# Patient Record
Sex: MALE | Race: WHITE | Employment: OTHER | URBAN - METROPOLITAN AREA
[De-identification: names, ages, dates, MRNs, and addresses within clinical notes are randomized per-mention and may not be internally consistent; named-entity substitution may affect disease eponyms.]

---

## 2017-04-26 ENCOUNTER — OFFICE VISIT (OUTPATIENT)
Dept: ENT CLINIC | Age: 67
End: 2017-04-26

## 2017-04-26 VITALS
WEIGHT: 185 LBS | HEART RATE: 62 BPM | SYSTOLIC BLOOD PRESSURE: 142 MMHG | BODY MASS INDEX: 26.48 KG/M2 | DIASTOLIC BLOOD PRESSURE: 92 MMHG | HEIGHT: 70 IN

## 2017-04-26 DIAGNOSIS — J31.0 RHINITIS, NONALLERGIC: Primary | ICD-10-CM

## 2017-04-26 PROCEDURE — 4040F PNEUMOC VAC/ADMIN/RCVD: CPT | Performed by: OTOLARYNGOLOGY

## 2017-04-26 PROCEDURE — 1123F ACP DISCUSS/DSCN MKR DOCD: CPT | Performed by: OTOLARYNGOLOGY

## 2017-04-26 PROCEDURE — G8420 CALC BMI NORM PARAMETERS: HCPCS | Performed by: OTOLARYNGOLOGY

## 2017-04-26 PROCEDURE — G8427 DOCREV CUR MEDS BY ELIG CLIN: HCPCS | Performed by: OTOLARYNGOLOGY

## 2017-04-26 PROCEDURE — 99213 OFFICE O/P EST LOW 20 MIN: CPT | Performed by: OTOLARYNGOLOGY

## 2017-04-26 PROCEDURE — 3017F COLORECTAL CA SCREEN DOC REV: CPT | Performed by: OTOLARYNGOLOGY

## 2017-04-26 PROCEDURE — 1036F TOBACCO NON-USER: CPT | Performed by: OTOLARYNGOLOGY

## 2017-04-26 RX ORDER — MONTELUKAST SODIUM 10 MG/1
10 TABLET ORAL NIGHTLY
Qty: 30 TABLET | Refills: 2 | Status: SHIPPED | OUTPATIENT
Start: 2017-04-26 | End: 2017-11-16 | Stop reason: SDUPTHER

## 2017-04-26 RX ORDER — LORAZEPAM 0.5 MG/1
.5-1 TABLET ORAL
COMMUNITY
Start: 2016-09-28

## 2017-04-26 RX ORDER — TRIAMCINOLONE ACETONIDE 55 UG/1
2 SPRAY, METERED NASAL DAILY
Qty: 1 INHALER | Refills: 3 | Status: SHIPPED | OUTPATIENT
Start: 2017-04-26 | End: 2022-10-17

## 2017-04-26 RX ORDER — SIMVASTATIN 10 MG
10 TABLET ORAL
COMMUNITY
Start: 2016-04-19

## 2017-06-09 RX ORDER — MONTELUKAST SODIUM 10 MG/1
TABLET ORAL
Qty: 90 TABLET | Refills: 3 | Status: SHIPPED | OUTPATIENT
Start: 2017-06-09 | End: 2017-11-14 | Stop reason: SDUPTHER

## 2017-11-14 ENCOUNTER — TELEPHONE (OUTPATIENT)
Dept: ENT CLINIC | Age: 67
End: 2017-11-14

## 2017-11-14 RX ORDER — MONTELUKAST SODIUM 10 MG/1
TABLET ORAL
Qty: 90 TABLET | Refills: 3 | Status: SHIPPED | OUTPATIENT
Start: 2017-11-14 | End: 2017-11-16 | Stop reason: SDUPTHER

## 2017-11-14 NOTE — TELEPHONE ENCOUNTER
[facility] R Trina Chandra 68 EN

## 2017-11-21 RX ORDER — MONTELUKAST SODIUM 10 MG/1
TABLET ORAL
Qty: 90 TABLET | Refills: 3 | Status: SHIPPED | OUTPATIENT
Start: 2017-11-21

## 2017-11-28 ENCOUNTER — TELEPHONE (OUTPATIENT)
Dept: DERMATOLOGY | Age: 67
End: 2017-11-28

## 2018-04-10 ENCOUNTER — OFFICE VISIT (OUTPATIENT)
Dept: DERMATOLOGY | Age: 68
End: 2018-04-10

## 2018-04-10 DIAGNOSIS — Z85.828 HISTORY OF SCC (SQUAMOUS CELL CARCINOMA) OF SKIN: ICD-10-CM

## 2018-04-10 DIAGNOSIS — L82.1 SK (SEBORRHEIC KERATOSIS): Primary | ICD-10-CM

## 2018-04-10 DIAGNOSIS — L57.0 AK (ACTINIC KERATOSIS): ICD-10-CM

## 2018-04-10 PROCEDURE — G8421 BMI NOT CALCULATED: HCPCS | Performed by: DERMATOLOGY

## 2018-04-10 PROCEDURE — 1036F TOBACCO NON-USER: CPT | Performed by: DERMATOLOGY

## 2018-04-10 PROCEDURE — 1123F ACP DISCUSS/DSCN MKR DOCD: CPT | Performed by: DERMATOLOGY

## 2018-04-10 PROCEDURE — 99202 OFFICE O/P NEW SF 15 MIN: CPT | Performed by: DERMATOLOGY

## 2018-04-10 PROCEDURE — 17000 DESTRUCT PREMALG LESION: CPT | Performed by: DERMATOLOGY

## 2018-04-10 PROCEDURE — G8427 DOCREV CUR MEDS BY ELIG CLIN: HCPCS | Performed by: DERMATOLOGY

## 2018-04-10 PROCEDURE — 3017F COLORECTAL CA SCREEN DOC REV: CPT | Performed by: DERMATOLOGY

## 2018-04-10 PROCEDURE — 17003 DESTRUCT PREMALG LES 2-14: CPT | Performed by: DERMATOLOGY

## 2018-04-10 PROCEDURE — 4040F PNEUMOC VAC/ADMIN/RCVD: CPT | Performed by: DERMATOLOGY

## 2018-04-10 RX ORDER — UBIDECARENONE 10 MG
CAPSULE ORAL
COMMUNITY

## 2018-04-10 RX ORDER — SILDENAFIL CITRATE 20 MG/1
40-100 TABLET ORAL
COMMUNITY
Start: 2017-10-05 | End: 2022-10-17

## 2018-04-10 RX ORDER — MULTIVIT-MIN/IRON/FOLIC ACID/K 18-600-40
CAPSULE ORAL
COMMUNITY

## 2018-04-10 RX ORDER — M-VIT,TX,IRON,MINS/CALC/FOLIC 27MG-0.4MG
1 TABLET ORAL DAILY
COMMUNITY

## 2018-10-24 ENCOUNTER — OFFICE VISIT (OUTPATIENT)
Dept: DERMATOLOGY | Age: 68
End: 2018-10-24
Payer: MEDICARE

## 2018-10-24 DIAGNOSIS — L82.1 SK (SEBORRHEIC KERATOSIS): ICD-10-CM

## 2018-10-24 DIAGNOSIS — L24.9 IRRITANT DERMATITIS: ICD-10-CM

## 2018-10-24 DIAGNOSIS — L73.8 SEBACEOUS GLAND HYPERPLASIA OF FACE: ICD-10-CM

## 2018-10-24 DIAGNOSIS — L57.0 AK (ACTINIC KERATOSIS): Primary | ICD-10-CM

## 2018-10-24 PROCEDURE — 99213 OFFICE O/P EST LOW 20 MIN: CPT | Performed by: DERMATOLOGY

## 2018-10-24 RX ORDER — TRIAMCINOLONE ACETONIDE 1 MG/G
CREAM TOPICAL
Qty: 45 G | Refills: 2 | Status: SHIPPED | OUTPATIENT
Start: 2018-10-24

## 2018-10-24 RX ORDER — FLUOROURACIL 50 MG/G
CREAM TOPICAL
Qty: 40 G | Refills: 0 | Status: SHIPPED | OUTPATIENT
Start: 2018-10-24

## 2019-04-24 ENCOUNTER — OFFICE VISIT (OUTPATIENT)
Dept: DERMATOLOGY | Age: 69
End: 2019-04-24
Payer: MEDICARE

## 2019-04-24 DIAGNOSIS — L82.1 SEBORRHEIC KERATOSIS: ICD-10-CM

## 2019-04-24 DIAGNOSIS — L57.0 ACTINIC KERATOSIS: Primary | ICD-10-CM

## 2019-04-24 PROCEDURE — 1123F ACP DISCUSS/DSCN MKR DOCD: CPT | Performed by: DERMATOLOGY

## 2019-04-24 PROCEDURE — 99213 OFFICE O/P EST LOW 20 MIN: CPT | Performed by: DERMATOLOGY

## 2019-04-24 PROCEDURE — 4040F PNEUMOC VAC/ADMIN/RCVD: CPT | Performed by: DERMATOLOGY

## 2019-04-24 PROCEDURE — G8427 DOCREV CUR MEDS BY ELIG CLIN: HCPCS | Performed by: DERMATOLOGY

## 2019-04-24 PROCEDURE — G8421 BMI NOT CALCULATED: HCPCS | Performed by: DERMATOLOGY

## 2019-04-24 PROCEDURE — 3017F COLORECTAL CA SCREEN DOC REV: CPT | Performed by: DERMATOLOGY

## 2019-04-24 PROCEDURE — 1036F TOBACCO NON-USER: CPT | Performed by: DERMATOLOGY

## 2019-04-24 NOTE — PROGRESS NOTES
[facility]  Lyly Shook  1950    77 y.o. male     Date of Visit: 4/24/2019    Chief Complaint: AKs    History of Present Illness:    1. F/u for multiple AKs on the forehead, cheeks, and hands - used Efudex cream daily for nearly 2 weeks with improvement. Still has some lesions on the hands. 2.  He complains of few lesions on the left thigh and shin. Outdoors a lot - like to fish and play golf.    Spends some time in [place].       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 Review of Systems:  Gen: Feels well, good sense of health. Skin: No new or changing moles, no history of keloids or hypertrophic scars. Heme: No abnormal bruising or bleeding. Past Medical History, Family History, Surgical History, Medications and Allergies reviewed. History reviewed. No pertinent past medical history. History reviewed. No pertinent surgical history. Allergies   Allergen Reactions    Doxycycline Other (See Comments)     Oral thrush     Outpatient Medications Marked as Taking for the 4/24/19 encounter (Office Visit) with Lynn Martinez MD   Medication Sig Dispense Refill    fluorouracil (EFUDEX) 5 % cream Apply to the sides of the forehead, lateral portions of the cheeks and right cheek twice daily for 14 days. 40 g 0    triamcinolone (KENALOG) 0.1 % cream Apply to affected areas twice daily for up to 2 weeks or until improved. For itching and dermatitis.  45 g 2    Coenzyme Q10 10 MG CAPS Take by mouth      Cholecalciferol (VITAMIN D) 2000 units CAPS capsule Take by mouth      Multiple Vitamins-Minerals (THERAPEUTIC MULTIVITAMIN-MINERALS) tablet Take 1 tablet

## 2020-01-13 ENCOUNTER — OFFICE VISIT (OUTPATIENT)
Dept: DERMATOLOGY | Age: 70
End: 2020-01-13
Payer: MEDICARE

## 2020-01-13 PROCEDURE — G8484 FLU IMMUNIZE NO ADMIN: HCPCS | Performed by: DERMATOLOGY

## 2020-01-13 PROCEDURE — G8427 DOCREV CUR MEDS BY ELIG CLIN: HCPCS | Performed by: DERMATOLOGY

## 2020-01-13 PROCEDURE — 4040F PNEUMOC VAC/ADMIN/RCVD: CPT | Performed by: DERMATOLOGY

## 2020-01-13 PROCEDURE — 17000 DESTRUCT PREMALG LESION: CPT | Performed by: DERMATOLOGY

## 2020-01-13 PROCEDURE — 99213 OFFICE O/P EST LOW 20 MIN: CPT | Performed by: DERMATOLOGY

## 2020-01-13 PROCEDURE — G8421 BMI NOT CALCULATED: HCPCS | Performed by: DERMATOLOGY

## 2020-01-13 PROCEDURE — 17003 DESTRUCT PREMALG LES 2-14: CPT | Performed by: DERMATOLOGY

## 2020-01-13 PROCEDURE — 3017F COLORECTAL CA SCREEN DOC REV: CPT | Performed by: DERMATOLOGY

## 2020-01-13 PROCEDURE — 1123F ACP DISCUSS/DSCN MKR DOCD: CPT | Performed by: DERMATOLOGY

## 2020-01-13 PROCEDURE — 1036F TOBACCO NON-USER: CPT | Performed by: DERMATOLOGY

## 2020-01-13 NOTE — PROGRESS NOTES
[facility]  Lyly Gomez  1950    68 y.o. male     Date of Visit: 1/13/2020    Chief Complaint: AKs    History of Present Illness:    1. Follow up for multiple actinic keratoses on the face and hands-treated with Efudex cream in 2019×2 with improvement. He complains of several lesions today on the right hand. He also has lesions on the face. 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 Outdoors a lot - like to fish and play golf.    Spends some time in [place].        Review of Systems:  Skin: No new or changing moles. Past Medical History, Family History, Surgical History, Medications and Allergies reviewed. History reviewed. No pertinent past medical history. History reviewed. No pertinent surgical history. Allergies   Allergen Reactions    Doxycycline Other (See Comments)     Oral thrush     Outpatient Medications Marked as Taking for the 1/13/20 encounter (Office Visit) with Danna Singh MD   Medication Sig Dispense Refill    fluorouracil (EFUDEX) 5 % cream Apply to the sides of the forehead, lateral portions of the cheeks and right cheek twice daily for 14 days. 40 g 0    triamcinolone (KENALOG) 0.1 % cream Apply to affected areas twice daily for up to 2 weeks or until improved. For itching and dermatitis.  45 g 2    Coenzyme Q10 10 MG CAPS Take by mouth      Cholecalciferol (VITAMIN D) 2000 units CAPS capsule Take by mouth      Multiple Vitamins-Minerals (THERAPEUTIC MULTIVITAMIN-MINERALS) tablet Take 1 tablet by mouth daily      montelukast (SINGULAIR) 10 MG tablet TAKE 1 TABLET DAILY 90 tablet 3    simvastatin (ZOCOR) 10 MG tablet Take 10 mg by mouth      LORazepam (ATIVAN) 0.5 MG tablet Take 0.5-1 mg by mouth      triamcinolone (NASACORT ALLERGY 24HR) 55 MCG/ACT nasal inhaler 2 sprays by Nasal route daily 1 Inhaler 3       Physical Examination       The following were examined and determined to be normal: Psych/Neuro, Scalp/hair, Conjunctivae/eyelids, Gums/teeth/lips, Neck, Breast/axilla/chest, Abdomen and Back. The following were examined and determined to be abnormal: Head/face, RUE and LUE. Well-appearing. 1.  Dorsum of the right hand-3, right wrist-1: Few hyperkeratotic erythematous macules/patches. Lateral aspects of the forehead and nasal dorsum with ill-defined scaly erythematous macules. Assessment and Plan     1. Actinic keratoses - few on the right hand, multiple on the forehead and nose    2 cycles of liquid nitrogen applied to 4 AKs: Dorsum of the right hand-3, right wrist-1. Patient was educated regarding the potential risks of blister formation, discomfort, hypopigmentation, and scar. Wound care was discussed. Efudex cream twice daily to lateral aspects of the forehead and nose for 14 days. We discussed the likely side effects of treatment including redness, crusting, itching and burning. Return in about 4 months (around 5/13/2020).

## 2020-08-20 ENCOUNTER — OFFICE VISIT (OUTPATIENT)
Dept: DERMATOLOGY | Age: 70
End: 2020-08-20
Payer: MEDICARE

## 2020-08-20 VITALS — TEMPERATURE: 97.5 F

## 2020-08-20 PROCEDURE — 1123F ACP DISCUSS/DSCN MKR DOCD: CPT | Performed by: DERMATOLOGY

## 2020-08-20 PROCEDURE — 1036F TOBACCO NON-USER: CPT | Performed by: DERMATOLOGY

## 2020-08-20 PROCEDURE — 4040F PNEUMOC VAC/ADMIN/RCVD: CPT | Performed by: DERMATOLOGY

## 2020-08-20 PROCEDURE — G8421 BMI NOT CALCULATED: HCPCS | Performed by: DERMATOLOGY

## 2020-08-20 PROCEDURE — 3017F COLORECTAL CA SCREEN DOC REV: CPT | Performed by: DERMATOLOGY

## 2020-08-20 PROCEDURE — G8427 DOCREV CUR MEDS BY ELIG CLIN: HCPCS | Performed by: DERMATOLOGY

## 2020-08-20 PROCEDURE — 99213 OFFICE O/P EST LOW 20 MIN: CPT | Performed by: DERMATOLOGY

## 2020-08-20 NOTE — PROGRESS NOTES
determined to be normal: Psych/Neuro, Scalp/hair, Conjunctivae/eyelids, Gums/teeth/lips, Neck, Breast/axilla/chest, Abdomen, Back, RLE, LLE and Nails/digits. The following were examined and determined to be abnormal: Head/face, RUE and LUE. Well appearing. 1.  Left cheek - small stuck on appearing verrucous white papule. 2.  Left upper forehead - multiple ill defined irreg shaped keratotic pink macules. Antihelix bilaterally, lateral cheeks, hands - few skin colored scaly pink macules. Assessment and Plan     1. SK (seborrheic keratosis)     Reassurance. 2. AK (actinic keratosis) -     Efudex cream twice daily to left side of the forehead for 14 days. We discussed the likely side effects of treatment including redness, crusting, itching and burning. Observe the smaller asymptomatic lesions on the remainder of the face and hands. Return in about 4 months (around 12/20/2020).

## 2020-12-09 ENCOUNTER — OFFICE VISIT (OUTPATIENT)
Dept: DERMATOLOGY | Age: 70
End: 2020-12-09
Payer: MEDICARE

## 2020-12-09 VITALS — TEMPERATURE: 97 F

## 2020-12-09 PROCEDURE — 99212 OFFICE O/P EST SF 10 MIN: CPT | Performed by: DERMATOLOGY

## 2020-12-09 PROCEDURE — G8484 FLU IMMUNIZE NO ADMIN: HCPCS | Performed by: DERMATOLOGY

## 2020-12-09 PROCEDURE — 4040F PNEUMOC VAC/ADMIN/RCVD: CPT | Performed by: DERMATOLOGY

## 2020-12-09 PROCEDURE — G8427 DOCREV CUR MEDS BY ELIG CLIN: HCPCS | Performed by: DERMATOLOGY

## 2020-12-09 PROCEDURE — G8421 BMI NOT CALCULATED: HCPCS | Performed by: DERMATOLOGY

## 2020-12-09 PROCEDURE — 3017F COLORECTAL CA SCREEN DOC REV: CPT | Performed by: DERMATOLOGY

## 2020-12-09 PROCEDURE — 1123F ACP DISCUSS/DSCN MKR DOCD: CPT | Performed by: DERMATOLOGY

## 2020-12-09 PROCEDURE — 1036F TOBACCO NON-USER: CPT | Performed by: DERMATOLOGY

## 2020-12-09 NOTE — PROGRESS NOTES
[facility]  [facility] Lyly Velasquez  1950    65 y.o. male     Date of Visit: 12/9/2020    Chief Complaint: follow up for AKs    History of Present Illness:    1. Follow-up for history of actinic keratoses-had multiple of the left side of the forehead at last visit. He used Efudex cream twice daily for 2 weeks with improvement. Still has few remaining lesions. He treated his face and hands with Efudex cream x2 in 2019. 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     Outdoors a lot - like to fish and play golf.    Spends some time in [place].        Review of Systems:  None. Past Medical History, Family History, Surgical History, Medications and Allergies reviewed. History reviewed. No pertinent past medical history. History reviewed. No pertinent surgical history. Allergies   Allergen Reactions    Doxycycline Other (See Comments)     Oral thrush     Outpatient Medications Marked as Taking for the 12/9/20 encounter (Office Visit) with Luz Pacheco MD   Medication Sig Dispense Refill    triamcinolone (KENALOG) 0.1 % cream Apply to affected areas twice daily for up to 2 weeks or until improved. For itching and dermatitis.  45 g 2    Coenzyme Q10 10 MG CAPS Take by mouth      Cholecalciferol (VITAMIN D) 2000 units CAPS capsule Take by mouth      Multiple Vitamins-Minerals (THERAPEUTIC MULTIVITAMIN-MINERALS) tablet Take 1 tablet by mouth daily      montelukast (SINGULAIR) 10 MG tablet TAKE 1 TABLET DAILY 90 tablet 3    simvastatin (ZOCOR) 10 MG tablet Take 10 mg by mouth      LORazepam (ATIVAN) 0.5 MG tablet Take 0.5-1 mg by mouth      triamcinolone (NASACORT ALLERGY 24HR) 55 MCG/ACT nasal inhaler 2 sprays by Nasal route daily 1 Inhaler 3         Physical Examination       Well appearing. 1.  Lateral upper portion of the forehead - few scaly pink macules. Assessment and Plan     1. Actinic keratoses - few, small    Efudex cream twice daily to left lateral forehead for 14 days.  We discussed the likely side effects of treatment including redness, crusting, itching and burning.           --Bairon Fermin MD

## 2021-08-09 ENCOUNTER — OFFICE VISIT (OUTPATIENT)
Dept: DERMATOLOGY | Age: 71
End: 2021-08-09
Payer: MEDICARE

## 2021-08-09 VITALS — TEMPERATURE: 97.4 F

## 2021-08-09 DIAGNOSIS — L57.0 AK (ACTINIC KERATOSIS): Primary | ICD-10-CM

## 2021-08-09 DIAGNOSIS — L82.1 SK (SEBORRHEIC KERATOSIS): ICD-10-CM

## 2021-08-09 PROCEDURE — 3017F COLORECTAL CA SCREEN DOC REV: CPT | Performed by: DERMATOLOGY

## 2021-08-09 PROCEDURE — 1036F TOBACCO NON-USER: CPT | Performed by: DERMATOLOGY

## 2021-08-09 PROCEDURE — G8427 DOCREV CUR MEDS BY ELIG CLIN: HCPCS | Performed by: DERMATOLOGY

## 2021-08-09 PROCEDURE — 99213 OFFICE O/P EST LOW 20 MIN: CPT | Performed by: DERMATOLOGY

## 2021-08-09 PROCEDURE — 1123F ACP DISCUSS/DSCN MKR DOCD: CPT | Performed by: DERMATOLOGY

## 2021-08-09 PROCEDURE — 4040F PNEUMOC VAC/ADMIN/RCVD: CPT | Performed by: DERMATOLOGY

## 2021-08-09 PROCEDURE — G8421 BMI NOT CALCULATED: HCPCS | Performed by: DERMATOLOGY

## 2021-08-09 NOTE — PATIENT INSTRUCTIONS
This winter. .. Use fluorouracil 5% cream twice daily for 2 weeks on the left forehead and affected areas of both ears for 14 days.

## 2021-08-09 NOTE — PROGRESS NOTES
[facility]  Lyly Kingsley Printers  1950    66 y.o. male     Date of Visit: 8/9/2021    Chief Complaint: skin lesions    History of Present Illness:    1. Follow-up for several AK's on the left lateral forehead-treated with Efudex cream twice daily for 2 weeks with some improvement. He treated his face and hands with Efudex cream x2 in 2019.    2.  He complains of several growths on the upper extremities. 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     Outdoors a lot - like to fish and play golf.    Spends some time in [place].        Review of Systems:  Gen: Feels well, good sense of health. Past Medical History, Family History, Surgical History, Medications and Allergies reviewed. History reviewed. No pertinent past medical history. History reviewed. No pertinent surgical history. Allergies   Allergen Reactions    Doxycycline Other (See Comments)     Oral thrush     Outpatient Medications Marked as Taking for the 8/9/21 encounter (Office Visit) with Prudencio Salomon MD   Medication Sig Dispense Refill    fluorouracil (EFUDEX) 5 % cream Apply to the sides of the forehead, lateral portions of the cheeks and right cheek twice daily for 14 days. 40 g 0    triamcinolone (KENALOG) 0.1 % cream Apply to affected areas twice daily for up to 2 weeks or until improved. For itching and dermatitis.  45 g 2    Coenzyme Q10 10 MG CAPS Take by mouth      Cholecalciferol (VITAMIN D) 2000 units CAPS capsule Take by mouth      Multiple Vitamins-Minerals (THERAPEUTIC MULTIVITAMIN-MINERALS) tablet Take 1 tablet by mouth daily      montelukast (SINGULAIR) 10 MG tablet TAKE 1 TABLET

## 2022-02-01 ENCOUNTER — OFFICE VISIT (OUTPATIENT)
Dept: DERMATOLOGY | Age: 72
End: 2022-02-01
Payer: MEDICARE

## 2022-02-01 VITALS — TEMPERATURE: 97.3 F

## 2022-02-01 DIAGNOSIS — L82.1 SK (SEBORRHEIC KERATOSIS): ICD-10-CM

## 2022-02-01 DIAGNOSIS — L57.0 AK (ACTINIC KERATOSIS): Primary | ICD-10-CM

## 2022-02-01 PROCEDURE — G8421 BMI NOT CALCULATED: HCPCS | Performed by: DERMATOLOGY

## 2022-02-01 PROCEDURE — 3017F COLORECTAL CA SCREEN DOC REV: CPT | Performed by: DERMATOLOGY

## 2022-02-01 PROCEDURE — 1123F ACP DISCUSS/DSCN MKR DOCD: CPT | Performed by: DERMATOLOGY

## 2022-02-01 PROCEDURE — 99212 OFFICE O/P EST SF 10 MIN: CPT | Performed by: DERMATOLOGY

## 2022-02-01 PROCEDURE — 17000 DESTRUCT PREMALG LESION: CPT | Performed by: DERMATOLOGY

## 2022-02-01 PROCEDURE — 1036F TOBACCO NON-USER: CPT | Performed by: DERMATOLOGY

## 2022-02-01 PROCEDURE — 4040F PNEUMOC VAC/ADMIN/RCVD: CPT | Performed by: DERMATOLOGY

## 2022-02-01 PROCEDURE — G8484 FLU IMMUNIZE NO ADMIN: HCPCS | Performed by: DERMATOLOGY

## 2022-02-01 PROCEDURE — 17003 DESTRUCT PREMALG LES 2-14: CPT | Performed by: DERMATOLOGY

## 2022-02-01 PROCEDURE — G8427 DOCREV CUR MEDS BY ELIG CLIN: HCPCS | Performed by: DERMATOLOGY

## 2022-02-01 NOTE — PROGRESS NOTES
[facility]  Lyly Irizarry  1950    66 y.o. male     Date of Visit: 2/1/2022    Chief Complaint: skin lesions    History of Present Illness:    1. He returns today to follow up for AKs. He used Efudex cream twice daily to the left side of the forehead, right earlobe and left antihelix for 14 days with improvement. 2.  He reports an asymptomatic lesion on the right upper forehead. He treated his face and hands with Efudex cream x2 in 2019. 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     Outdoors a lot - like to fish and play golf.    Spends some time in [place].       Review of Systems:  Gen: Feels well, good sense of health. Skin: No new or changing moles. Past Medical History, Family History, Surgical History, Medications and Allergies reviewed. History reviewed. No pertinent past medical history. History reviewed. No pertinent surgical history. Allergies   Allergen Reactions    Doxycycline Other (See Comments)     Oral thrush     Outpatient Medications Marked as Taking for the 2/1/22 encounter (Office Visit) with Radha Duron MD   Medication Sig Dispense Refill    triamcinolone (KENALOG) 0.1 % cream Apply to affected areas twice daily for up to 2 weeks or until improved. For itching and dermatitis.  45 g 2    Coenzyme Q10 10 MG CAPS Take by mouth      Cholecalciferol (VITAMIN D) 2000 units CAPS capsule Take by mouth      Multiple Vitamins-Minerals (THERAPEUTIC MULTIVITAMIN-MINERALS) tablet Take 1 tablet by mouth daily      montelukast (SINGULAIR) 10 MG tablet TAKE 1 TABLET DAILY 90 tablet 3    simvastatin (ZOCOR) 10 MG tablet Take 10 mg by mouth  LORazepam (ATIVAN) 0.5 MG tablet Take 0.5-1 mg by mouth      triamcinolone (NASACORT ALLERGY 24HR) 55 MCG/ACT nasal inhaler 2 sprays by Nasal route daily 1 Inhaler 3         Physical Examination       The following were examined and determined to be normal: Head/face, RUE and LUE. The following were examined and determined to be abnormal: Psych/Neuro, Scalp/hair, Conjunctivae/eyelids, Gums/teeth/lips, Neck, Breast/axilla/chest, Abdomen, Back, RLE, LLE and Nails/digits. Well appearing. 1.  Left upper lip - 1, right hand - 1, left hand - 2: ill defined keratotic pink macules and patches. Left side of the face with scattered pink macules. 2.  Right upper forehead - stuck on appearing verrucous tan plaque. Assessment and Plan     1. AK (actinic keratosis) - improved with Efudex    2 cycles of liquid nitrogen applied to 4 AKs: Left upper lip - 1, right hand - 1, left hand - 2. Patient was educated regarding the potential risks of blister formation and discomfort. Wound care was discussed. 2. SK (seborrheic keratosis)     Reassurance. Return in about 6 months (around 8/1/2022).     --Herson Park MD

## 2022-10-17 ENCOUNTER — OFFICE VISIT (OUTPATIENT)
Dept: DERMATOLOGY | Age: 72
End: 2022-10-17
Payer: MEDICARE

## 2022-10-17 DIAGNOSIS — L57.0 ACTINIC KERATOSIS: ICD-10-CM

## 2022-10-17 DIAGNOSIS — C44.722 SCC (SQUAMOUS CELL CARCINOMA), LEG, RIGHT: Primary | ICD-10-CM

## 2022-10-17 PROCEDURE — 17003 DESTRUCT PREMALG LES 2-14: CPT | Performed by: DERMATOLOGY

## 2022-10-17 PROCEDURE — 17000 DESTRUCT PREMALG LESION: CPT | Performed by: DERMATOLOGY

## 2022-10-17 PROCEDURE — 17261 DSTRJ MAL LES T/A/L .6-1.0CM: CPT | Performed by: DERMATOLOGY

## 2022-10-17 RX ORDER — TADALAFIL 10 MG/1
10 TABLET ORAL PRN
COMMUNITY

## 2022-10-17 NOTE — PATIENT INSTRUCTIONS

## 2022-10-17 NOTE — PROGRESS NOTES
[facility]  Lyly Blum  1950    74 y.o. male     Date of Visit: 10/17/2022    Chief Complaint: skin lesions    History of Present Illness:    1. He presents today for an enlarging tender lesion on the right superior lateral leg. 2.  He also returns today to follow up for a history of AKs - treated with cryotherapy and Efudex in the past.     Derm Hx:    He has a history of actinic keratoses on the face and hands-treated with cryotherapy and Efudex in the past.     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 Outdoors a lot - like to fish and play golf. Spends some time in [place]. Review of Systems:  Gen: Feels well, good sense of health. Skin: No new or changing moles. Past Medical History, Family History, Surgical History, Medications and Allergies reviewed. History reviewed. No pertinent past medical history. No past surgical history on file. Allergies   Allergen Reactions    Doxycycline Other (See Comments)     Oral thrush     Outpatient Medications Marked as Taking for the 10/17/22 encounter (Office Visit) with Paris Mullins MD   Medication Sig Dispense Refill    tadalafil (CIALIS) 10 MG tablet Take 10 mg by mouth as needed for Erectile Dysfunction      fluorouracil (EFUDEX) 5 % cream Apply to the sides of the forehead, lateral portions of the cheeks and right cheek twice daily for 14 days. 40 g 0    triamcinolone (KENALOG) 0.1 % cream Apply to affected areas twice daily for up to 2 weeks or until improved. For itching and dermatitis.  45 g 2    Coenzyme Q10 10 MG CAPS Take by mouth      Cholecalciferol (VITAMIN D) 2000 units CAPS capsule Take by mouth      Multiple Vitamins-Minerals (THERAPEUTIC MULTIVITAMIN-MINERALS) tablet Take 1 tablet by mouth daily      montelukast (SINGULAIR) 10 MG tablet TAKE 1 TABLET DAILY 90 tablet 3    simvastatin (ZOCOR) 10 MG tablet Take 10 mg by mouth      LORazepam (ATIVAN) 0.5 MG tablet Take 0.5-1 mg by mouth           Physical Examination       The following were examined and determined to be normal: Psych/Neuro, Scalp/hair, Conjunctivae/eyelids, Gums/teeth/lips, Neck, Breast/axilla/chest, Abdomen, Back, RUE, LLE, and Nails/digits. The following were examined and determined to be abnormal: Head/face, LUE, and RLE. Well appearing. 1.  Right superior lateral leg - 9 mm round dome shaped pink nodule with central hyperkeratosis. 2.  Left index finger - 1, left hand - 1, right upper lip - 1: ill defined keratotic pink macules. Assessment and Plan     1. Moderately diff. SCC (squamous cell carcinoma), leg, right - 9 mm    Discussed likely diagnosis; patient agreeable to shave biopsy and curettage (written consent obtained). We also discussed the risks of bleeding, scar, and infection. The area(s) to be biopsied were marked with a surgical pen. Alcohol was used to cleanse the site. Local anesthesia was acheived with 1% lidocaine with epinephrine. Shave biopsy was performed using a razor blade followed by sharp curettage in multiple directions and 3 times by electrodesiccation. Hemostasis was achieved with aluminum chloride. The wound(s) were dressed with petrolatum and covered with a bandage. Wound care instructions were reviewed. 1 Specimen (s) sent to pathology. The specimen bottles were appropriately labeled. The patient tolerated the procedure well and there were no immediate complications. 2. Actinic keratosis - 3    Cryotherapy was discussed and patient agreed to proceed. Consent was obtained.   3 lesions were treated cryotherapy: Left index finger - 1, left hand - 1, right upper lip - 1. 2 cycles of liquid nitrogen applied to each lesion for 5 seconds using a Cry-Ac cryo spray gun. Patient was educated regarding the potential risks of blister formation and discomfort. Wound care was discussed. The patient tolerated the procedure well and there were no immediate complications. Return in about 6 months (around 4/17/2023).     --Stephanie Cabrera MD

## 2022-10-19 LAB — DERMATOLOGY PATHOLOGY REPORT: ABNORMAL

## 2023-02-01 ENCOUNTER — OFFICE VISIT (OUTPATIENT)
Dept: DERMATOLOGY | Age: 73
End: 2023-02-01

## 2023-02-01 DIAGNOSIS — L82.1 SK (SEBORRHEIC KERATOSIS): ICD-10-CM

## 2023-02-01 DIAGNOSIS — L57.0 AK (ACTINIC KERATOSIS): Primary | ICD-10-CM

## 2023-02-01 DIAGNOSIS — L81.4 SOLAR LENTIGINOSIS: ICD-10-CM

## 2023-02-01 DIAGNOSIS — D18.01 CHERRY ANGIOMA: ICD-10-CM

## 2023-02-01 NOTE — PROGRESS NOTES
[facility]  Lyly Salgado  1950    74 y.o. male     Date of Visit: 2/1/2023    Chief Complaint: skin lesions    History of Present Illness:    1. He has several persistent scaly lesions on the scalp and face. 2.  He reports several growths on the back, left shin and scalp. 3.  He has stable pigmented lesions on the chest and extremities. 4.  He has a dark lesion on the central chest.    He has a recent history of a moderately differentiated SCC of the right superior lateral leg-treated with electrodesiccation and curettage on 10/17/2022. Derm Hx:     He has a history of actinic keratoses on the face and hands-treated with cryotherapy and Efudex in the past.     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 Outdoors a lot - like to fish and play golf. Spends some time in [place]. Review of Systems:  Gen: Feels well, good sense of health. Skin: No new or changing moles. Past Medical History, Family History, Surgical History, Medications and Allergies reviewed. History reviewed. No pertinent past medical history. History reviewed. No pertinent surgical history. Allergies   Allergen Reactions    Doxycycline Other (See Comments)     Oral thrush     Outpatient Medications Marked as Taking for the 2/1/23 encounter (Office Visit) with Sarah Ignacio MD   Medication Sig Dispense Refill    tadalafil (CIALIS) 10 MG tablet Take 10 mg by mouth as needed for Erectile Dysfunction      fluorouracil (EFUDEX) 5 % cream Apply to the sides of the forehead, lateral portions of the cheeks and right cheek twice daily for 14 days.  40 g 0    triamcinolone (KENALOG) 0.1 % cream Apply to affected areas twice daily for up to 2 weeks or until improved. For itching and dermatitis. 45 g 2    Coenzyme Q10 10 MG CAPS Take by mouth      Cholecalciferol (VITAMIN D) 2000 units CAPS capsule Take by mouth      montelukast (SINGULAIR) 10 MG tablet TAKE 1 TABLET DAILY 90 tablet 3    simvastatin (ZOCOR) 10 MG tablet Take 10 mg by mouth      LORazepam (ATIVAN) 0.5 MG tablet Take 0.5-1 mg by mouth           Physical Examination       The following were examined and determined to be normal: Psych/Neuro, Conjunctivae/eyelids, Gums/teeth/lips, Neck, Breast/axilla/chest, Abdomen, Back, RUE, LUE, RLE, LLE, and Nails/digits. The following were examined and determined to be abnormal: Scalp/hair and Head/face. Well appearing. 1.  Right earlobe - 1, crown of the scalp - 2: ill defined keratotic pink macules. Left temple and left nasal sidewall - ill defined scaly pink macules. 2.  Back, vertex scalp, left shin: stuck-on appearing tan-brown verrucous papules and plaques. 3.  Chest and extremities with multiple round smooth brown macules and patches. 4.  Central chest - round smooth violaceous papule. Assessment and Plan     1. AK (actinic keratosis) - 3    Cryotherapy was discussed and patient agreed to proceed. Consent was obtained. 3 lesions were treated cryotherapy: Right earlobe - 1, crown of the scalp - 2. 2 cycles of liquid nitrogen applied to each lesion for 5 seconds using a Cry-Ac cryo spray gun. Patient was educated regarding the potential risks of blister formation and discomfort. Wound care was discussed. The patient tolerated the procedure well and there were no immediate complications. Efudex cream twice daily to the left temple and left nasal sidewall for 14 days. We discussed the likely side effects of treatment including redness, crusting, itching and burning. 2. SK (seborrheic keratosis)     Reassurance.       3. Solar lentiginosis Monitor for change. Sun protective behaviors encouraged including use of at least SPF 30 or more sunscreen. 4. Cherry angioma     Reassurance. Return in about 6 months (around 8/1/2023).     --Madison Florian MD

## 2023-08-01 ENCOUNTER — OFFICE VISIT (OUTPATIENT)
Dept: DERMATOLOGY | Age: 73
End: 2023-08-01
Payer: MEDICARE

## 2023-08-01 DIAGNOSIS — L57.0 AK (ACTINIC KERATOSIS): ICD-10-CM

## 2023-08-01 DIAGNOSIS — L73.9 FOLLICULITIS: ICD-10-CM

## 2023-08-01 DIAGNOSIS — L82.1 SK (SEBORRHEIC KERATOSIS): Primary | ICD-10-CM

## 2023-08-01 DIAGNOSIS — L81.4 SOLAR LENTIGINOSIS: ICD-10-CM

## 2023-08-01 DIAGNOSIS — L85.1 STUCCO KERATOSES: ICD-10-CM

## 2023-08-01 PROCEDURE — 17003 DESTRUCT PREMALG LES 2-14: CPT | Performed by: DERMATOLOGY

## 2023-08-01 PROCEDURE — G8421 BMI NOT CALCULATED: HCPCS | Performed by: DERMATOLOGY

## 2023-08-01 PROCEDURE — G8427 DOCREV CUR MEDS BY ELIG CLIN: HCPCS | Performed by: DERMATOLOGY

## 2023-08-01 PROCEDURE — 17000 DESTRUCT PREMALG LESION: CPT | Performed by: DERMATOLOGY

## 2023-08-01 PROCEDURE — 1123F ACP DISCUSS/DSCN MKR DOCD: CPT | Performed by: DERMATOLOGY

## 2023-08-01 PROCEDURE — 1036F TOBACCO NON-USER: CPT | Performed by: DERMATOLOGY

## 2023-08-01 PROCEDURE — 3017F COLORECTAL CA SCREEN DOC REV: CPT | Performed by: DERMATOLOGY

## 2023-08-01 PROCEDURE — 99213 OFFICE O/P EST LOW 20 MIN: CPT | Performed by: DERMATOLOGY

## 2023-08-01 RX ORDER — CEPHALEXIN 500 MG/1
500 CAPSULE ORAL 2 TIMES DAILY
Qty: 20 CAPSULE | Refills: 0 | Status: SHIPPED | OUTPATIENT
Start: 2023-08-01 | End: 2023-08-11

## 2023-08-01 NOTE — PROGRESS NOTES
agreed to proceed. Consent was obtained. 5 lesions were treated cryotherapy: Right hand - 1, right forearm - 2, left temple - 1, left cheek - 1. 2 cycles of liquid nitrogen applied to each lesion for 5 seconds using a [employer]-Ac cryo spray gun. Patient was educated regarding the potential risks of blister formation and discomfort. Wound care was discussed. The patient tolerated the procedure well and there were no immediate complications. Return in about 6 months (around 2/1/2024).     --Drea Butcher MD

## 2023-08-15 ENCOUNTER — TELEPHONE (OUTPATIENT)
Dept: DERMATOLOGY | Age: 73
End: 2023-08-15

## 2023-08-15 RX ORDER — CEPHALEXIN 500 MG/1
500 CAPSULE ORAL 2 TIMES DAILY
Qty: 20 CAPSULE | Refills: 0 | Status: SHIPPED | OUTPATIENT
Start: 2023-08-15 | End: 2023-08-25

## 2024-02-06 ENCOUNTER — OFFICE VISIT (OUTPATIENT)
Dept: DERMATOLOGY | Age: 74
End: 2024-02-06
Payer: MEDICARE

## 2024-02-06 DIAGNOSIS — L57.0 AK (ACTINIC KERATOSIS): ICD-10-CM

## 2024-02-06 DIAGNOSIS — L82.1 SK (SEBORRHEIC KERATOSIS): ICD-10-CM

## 2024-02-06 DIAGNOSIS — L73.9 FOLLICULITIS: Primary | ICD-10-CM

## 2024-02-06 DIAGNOSIS — Z85.828 HISTORY OF SCC (SQUAMOUS CELL CARCINOMA) OF SKIN: ICD-10-CM

## 2024-02-06 DIAGNOSIS — L81.4 SOLAR LENTIGINOSIS: ICD-10-CM

## 2024-02-06 PROCEDURE — G8421 BMI NOT CALCULATED: HCPCS | Performed by: DERMATOLOGY

## 2024-02-06 PROCEDURE — 3017F COLORECTAL CA SCREEN DOC REV: CPT | Performed by: DERMATOLOGY

## 2024-02-06 PROCEDURE — 1036F TOBACCO NON-USER: CPT | Performed by: DERMATOLOGY

## 2024-02-06 PROCEDURE — G8427 DOCREV CUR MEDS BY ELIG CLIN: HCPCS | Performed by: DERMATOLOGY

## 2024-02-06 PROCEDURE — G8484 FLU IMMUNIZE NO ADMIN: HCPCS | Performed by: DERMATOLOGY

## 2024-02-06 PROCEDURE — 99213 OFFICE O/P EST LOW 20 MIN: CPT | Performed by: DERMATOLOGY

## 2024-02-06 PROCEDURE — 1123F ACP DISCUSS/DSCN MKR DOCD: CPT | Performed by: DERMATOLOGY

## 2024-02-06 RX ORDER — CEPHALEXIN 500 MG/1
500 CAPSULE ORAL 2 TIMES DAILY
Qty: 20 CAPSULE | Refills: 0 | Status: SHIPPED | OUTPATIENT
Start: 2024-02-06 | End: 2024-02-16

## 2024-02-06 NOTE — PROGRESS NOTES
[facility]  Elias Butcher MD  [phone]      Dylan Leslie  1950    73 y.o. male     Date of Visit: 2/6/2024    Chief Complaint: skin lesions    History of Present Illness:    1.  He has a history of recurrent scalp folliculitis - cleared in the past with 10-day course of Keflex.     2.  He also reports few growths on the scalp and lower extremities.    3.  He has stable freckling on the extremities.    4.  He has few asymptomatic scaly lesions on the sides of the face.    5.   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      Hx: Efudex to the left temple and left nasal sidewall for 14 days.    Derm Hx:     He has a history of actinic keratoses on the face and hands-treated with cryotherapy and Efudex in the past.     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     Outdoors a lot - like to fish and play golf.    Spends some time in [place]        Review of Systems:  Gen: Feels well, good sense of health.  Skin: No new or changing moles.    Past Medical History, Family History, Surgical History, Medications and Allergies reviewed.    History reviewed. No pertinent past medical history.  History reviewed. No pertinent surgical history.    Allergies   Allergen Reactions    Doxycycline Other (See Comments)     Oral thrush     Outpatient Medications Marked as Taking for the 2/6/24 encounter (Office Visit) with Elias Butcher MD   Medication Sig Dispense Refill    cephALEXin (KEFLEX) 500 MG capsule Take 1 capsule by mouth 2 times daily for 10 days 20 capsule 0    tadalafil (CIALIS) 10 MG tablet Take 1 tablet by

## 2024-08-13 ENCOUNTER — OFFICE VISIT (OUTPATIENT)
Dept: DERMATOLOGY | Age: 74
End: 2024-08-13
Payer: MEDICARE

## 2024-08-13 DIAGNOSIS — Z85.828 HISTORY OF SCC (SQUAMOUS CELL CARCINOMA) OF SKIN: ICD-10-CM

## 2024-08-13 DIAGNOSIS — L57.0 AK (ACTINIC KERATOSIS): ICD-10-CM

## 2024-08-13 DIAGNOSIS — L82.1 SK (SEBORRHEIC KERATOSIS): ICD-10-CM

## 2024-08-13 DIAGNOSIS — L73.9 FOLLICULITIS: Primary | ICD-10-CM

## 2024-08-13 PROCEDURE — G8421 BMI NOT CALCULATED: HCPCS | Performed by: DERMATOLOGY

## 2024-08-13 PROCEDURE — G8427 DOCREV CUR MEDS BY ELIG CLIN: HCPCS | Performed by: DERMATOLOGY

## 2024-08-13 PROCEDURE — 17000 DESTRUCT PREMALG LESION: CPT | Performed by: DERMATOLOGY

## 2024-08-13 PROCEDURE — 1123F ACP DISCUSS/DSCN MKR DOCD: CPT | Performed by: DERMATOLOGY

## 2024-08-13 PROCEDURE — 17003 DESTRUCT PREMALG LES 2-14: CPT | Performed by: DERMATOLOGY

## 2024-08-13 PROCEDURE — 99213 OFFICE O/P EST LOW 20 MIN: CPT | Performed by: DERMATOLOGY

## 2024-08-13 PROCEDURE — 1036F TOBACCO NON-USER: CPT | Performed by: DERMATOLOGY

## 2024-08-13 RX ORDER — CEPHALEXIN 500 MG/1
500 CAPSULE ORAL 2 TIMES DAILY
Qty: 20 CAPSULE | Refills: 0 | Status: SHIPPED | OUTPATIENT
Start: 2024-08-13 | End: 2024-08-23

## 2024-08-13 NOTE — PROGRESS NOTES
[facility]  Elias Butcher MD  [phone]      Dylan Lselie  1950    74 y.o. male     Date of Visit: 8/13/2024    Chief Complaint: scalp issues, skin lesions    History of Present Illness:    1.  He has a history of scalp folliculitis that improved with Keflex 500 mg twice daily for 10 days in the past.  Has had few recurrences.     2.  He has several persistent scaly lesions on the face.     3.  He has few asymptomatic growths on the forearms.     4.  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     Derm Hx:     He has a history of actinic keratoses on the face and hands-treated with cryotherapy and Efudex in the past.     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     Outdoors a lot - like to fish and play golf.    Spends some time in [place]         Review of Systems:  Gen: Feels well, good sense of health.    Past Medical History, Family History, Surgical History, Medications and Allergies reviewed.    History reviewed. No pertinent past medical history.  History reviewed. No pertinent surgical history.    Allergies   Allergen Reactions    Doxycycline Other (See Comments)     Oral thrush     Outpatient Medications Marked as Taking for the 8/13/24 encounter (Office Visit) with Elias Butcher MD   Medication Sig Dispense Refill    cephALEXin (KEFLEX) 500 MG capsule Take 1 capsule by mouth 2 times daily for 10 days 20 capsule 0    tadalafil (CIALIS) 10 MG tablet Take 1 tablet by mouth as needed for Erectile Dysfunction      fluorouracil (EFUDEX) 5 % cream Apply to the sides of the forehead, lateral portions

## 2025-02-11 ENCOUNTER — OFFICE VISIT (OUTPATIENT)
Age: 75
End: 2025-02-11
Payer: MEDICARE

## 2025-02-11 DIAGNOSIS — L81.4 SOLAR LENTIGINOSIS: ICD-10-CM

## 2025-02-11 DIAGNOSIS — M67.449 GANGLION CYST OF FINGER: Primary | ICD-10-CM

## 2025-02-11 DIAGNOSIS — Z85.828 HISTORY OF SCC (SQUAMOUS CELL CARCINOMA) OF SKIN: ICD-10-CM

## 2025-02-11 DIAGNOSIS — L57.0 AK (ACTINIC KERATOSIS): ICD-10-CM

## 2025-02-11 DIAGNOSIS — L82.1 SK (SEBORRHEIC KERATOSIS): ICD-10-CM

## 2025-02-11 PROCEDURE — 1036F TOBACCO NON-USER: CPT | Performed by: DERMATOLOGY

## 2025-02-11 PROCEDURE — 99213 OFFICE O/P EST LOW 20 MIN: CPT | Performed by: DERMATOLOGY

## 2025-02-11 PROCEDURE — 99212 OFFICE O/P EST SF 10 MIN: CPT | Performed by: DERMATOLOGY

## 2025-02-11 PROCEDURE — 1123F ACP DISCUSS/DSCN MKR DOCD: CPT | Performed by: DERMATOLOGY

## 2025-02-11 PROCEDURE — G8427 DOCREV CUR MEDS BY ELIG CLIN: HCPCS | Performed by: DERMATOLOGY

## 2025-02-11 PROCEDURE — G8421 BMI NOT CALCULATED: HCPCS | Performed by: DERMATOLOGY

## 2025-02-11 PROCEDURE — 1159F MED LIST DOCD IN RCRD: CPT | Performed by: DERMATOLOGY

## 2025-02-11 NOTE — PROGRESS NOTES
[facility]  Elias Butcher MD  [phone]      Dylan Leslie  1950    74 y.o. male     Date of Visit: 2/11/2025    Chief Complaint: skin lesions    History of Present Illness:    1.  He reports an 18 month history of a persistent lesion on the left middle finger.      2.  He has few asymptomatic scaly lesions on the face and hands.    3.  He reports asymptomatic growths on the back.    4.  He also has stable freckling on the extremities.    5.  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      Derm Hx:     He has a history of actinic keratoses on the face and hands-treated with cryotherapy and Efudex in the past.     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     Outdoors a lot - like to fish and play golf.    Spends some time in [place]        Review of Systems:  Gen: Feels well, good sense of health.    Past Medical History, Family History, Surgical History, Medications and Allergies reviewed.    History reviewed. No pertinent past medical history.  No past surgical history on file.    Allergies   Allergen Reactions    Doxycycline Other (See Comments)     Oral thrush     Outpatient Medications Marked as Taking for the 2/11/25 encounter (Office Visit) with Elias Butcher MD   Medication Sig Dispense Refill    tadalafil (CIALIS) 10 MG tablet Take 1 tablet by mouth as needed for Erectile Dysfunction      fluorouracil (EFUDEX) 5 % cream Apply to the sides of the forehead, lateral portions of the cheeks and right cheek twice daily for 14 days. 40 g 0    triamcinolone (KENALOG) 0.1 % cream Apply to affected areas